# Patient Record
Sex: FEMALE | Race: WHITE
[De-identification: names, ages, dates, MRNs, and addresses within clinical notes are randomized per-mention and may not be internally consistent; named-entity substitution may affect disease eponyms.]

---

## 2020-02-11 ENCOUNTER — HOSPITAL ENCOUNTER (INPATIENT)
Dept: HOSPITAL 53 - M ED | Age: 50
LOS: 3 days | Discharge: HOME | DRG: 720 | End: 2020-02-14
Attending: INTERNAL MEDICINE | Admitting: INTERNAL MEDICINE
Payer: COMMERCIAL

## 2020-02-11 VITALS — WEIGHT: 201.72 LBS | HEIGHT: 62 IN | BODY MASS INDEX: 37.12 KG/M2

## 2020-02-11 DIAGNOSIS — N17.9: ICD-10-CM

## 2020-02-11 DIAGNOSIS — Z87.891: ICD-10-CM

## 2020-02-11 DIAGNOSIS — A41.9: Primary | ICD-10-CM

## 2020-02-11 DIAGNOSIS — Z79.899: ICD-10-CM

## 2020-02-11 DIAGNOSIS — N73.0: ICD-10-CM

## 2020-02-11 DIAGNOSIS — F41.9: ICD-10-CM

## 2020-02-11 LAB
ALBUMIN SERPL BCG-MCNC: 2.6 GM/DL (ref 3.2–5.2)
ALT SERPL W P-5'-P-CCNC: 63 U/L (ref 12–78)
BASOPHILS # BLD AUTO: 0.1 10^3/UL (ref 0–0.2)
BASOPHILS NFR BLD AUTO: 0.3 % (ref 0–1)
BILIRUB CONJ SERPL-MCNC: 0.4 MG/DL (ref 0–0.2)
BILIRUB SERPL-MCNC: 0.8 MG/DL (ref 0.2–1)
BUN SERPL-MCNC: 9 MG/DL (ref 7–18)
CALCIUM SERPL-MCNC: 8.7 MG/DL (ref 8.5–10.1)
CHLAMYDIA DNA AMPLIFICATION: NEGATIVE
CHLORIDE SERPL-SCNC: 101 MEQ/L (ref 98–107)
CO2 SERPL-SCNC: 29 MEQ/L (ref 21–32)
CREAT SERPL-MCNC: 0.75 MG/DL (ref 0.55–1.3)
EOSINOPHIL # BLD AUTO: 0 10^3/UL (ref 0–0.5)
EOSINOPHIL NFR BLD AUTO: 0.1 % (ref 0–3)
GFR SERPL CREATININE-BSD FRML MDRD: > 60 ML/MIN/{1.73_M2} (ref 58–?)
GLUCOSE SERPL-MCNC: 131 MG/DL (ref 70–100)
HCT VFR BLD AUTO: 35.2 % (ref 36–47)
HGB BLD-MCNC: 11.1 G/DL (ref 12–15.5)
LIPASE SERPL-CCNC: 58 U/L (ref 73–393)
LYMPHOCYTES # BLD AUTO: 2.2 10^3/UL (ref 1.5–5)
LYMPHOCYTES NFR BLD AUTO: 7.8 % (ref 24–44)
MCH RBC QN AUTO: 32.5 PG (ref 27–33)
MCHC RBC AUTO-ENTMCNC: 31.5 G/DL (ref 32–36.5)
MCV RBC AUTO: 102.9 FL (ref 80–96)
MONOCYTES # BLD AUTO: 1.9 10^3/UL (ref 0–0.8)
MONOCYTES NFR BLD AUTO: 6.6 % (ref 0–5)
N GONORRHOEA RRNA SPEC QL NAA+PROBE: NEGATIVE
NEUTROPHILS # BLD AUTO: 24 10^3/UL (ref 1.5–8.5)
NEUTROPHILS NFR BLD AUTO: 83.1 % (ref 36–66)
PLATELET # BLD AUTO: 447 10^3/UL (ref 150–450)
POTASSIUM SERPL-SCNC: 3.4 MEQ/L (ref 3.5–5.1)
PROT SERPL-MCNC: 6.9 GM/DL (ref 6.4–8.2)
RBC # BLD AUTO: 3.42 10^6/UL (ref 4–5.4)
SODIUM SERPL-SCNC: 139 MEQ/L (ref 136–145)
WBC # BLD AUTO: 28.9 10^3/UL (ref 4–10)

## 2020-02-11 RX ADMIN — SODIUM CHLORIDE SCH MLS/HR: 9 INJECTION, SOLUTION INTRAVENOUS at 19:06

## 2020-02-11 RX ADMIN — SODIUM CHLORIDE SCH MLS/HR: 9 INJECTION, SOLUTION INTRAVENOUS at 22:41

## 2020-02-11 RX ADMIN — SODIUM CHLORIDE SCH MLS/HR: 9 INJECTION, SOLUTION INTRAVENOUS at 21:19

## 2020-02-11 NOTE — REPVR
PROCEDURE INFORMATION: 

Exam: US Pelvis Complete, Transabdominal 

Exam date and time: 2/11/2020 8:31 PM 

Age: 49 years old 

Clinical indication: Abnormal findings; Abnormal imaging test; Additional info: 

Uterine stranding 



TECHNIQUE: 

Imaging protocol: Real-time transabdominal pelvic ultrasound with image 

documentation. Complete exam. 



COMPARISON: 

CT ABD PELVIS W/O CONTRAST 2/11/2020 5:46 PM 



FINDINGS: 

Uterus/cervix: Intrauterine device is in the lower uterine segment. Endometrial 

thickness is normal at 6 mm. The uterus is mildly enlarged measuring 14 x 7 x 6 

cm. No discrete measurable uterine or endometrial mass is seen. 

Right adnexa: Right ovary is enlarged measuring 8.2 x 6.2 x 6.3 cm. Multiple 

complex cystic areas are present within the right ovary measuring up to 5.5 cm. 

Normal with Doppler blood flow in the right ovary. No hydrosalpinx. 

Left adnexa: Left ovary is enlarged measuring 6.9 x 6.7 x 6.3 cm. Normal blood 

flow in the left ovary. Multiple complex cystic areas are present in the left 

ovary, similar to the right side. No hydrosalpinx. 

Free fluid: None. 

Bladder: Normal. 



IMPRESSION: 

1. Heterogeneous enlarged uterus. Ovaries also appear large and heterogeneous, 

but the ovaries are not separable from the adjacent uterus. Findings may be due 

complex cysts in the ovaries. However findings may be secondary to a 

bicarbonate uterus with multiple fibroids. Pelvic MRI may be beneficial to 

better delineate the anatomy. 

2. IUD lies in the lower uterine segment. 

3. No evidence of torsion. 



Electronically signed by: David Martinez On 02/11/2020  21:44:20 PM

## 2020-02-12 VITALS — DIASTOLIC BLOOD PRESSURE: 99 MMHG | SYSTOLIC BLOOD PRESSURE: 154 MMHG

## 2020-02-12 VITALS — DIASTOLIC BLOOD PRESSURE: 78 MMHG | SYSTOLIC BLOOD PRESSURE: 140 MMHG

## 2020-02-12 VITALS — DIASTOLIC BLOOD PRESSURE: 78 MMHG | SYSTOLIC BLOOD PRESSURE: 135 MMHG

## 2020-02-12 VITALS — SYSTOLIC BLOOD PRESSURE: 113 MMHG | DIASTOLIC BLOOD PRESSURE: 70 MMHG

## 2020-02-12 VITALS — SYSTOLIC BLOOD PRESSURE: 154 MMHG | DIASTOLIC BLOOD PRESSURE: 99 MMHG

## 2020-02-12 LAB
B-HCG SERPL QL: NEGATIVE
BUN SERPL-MCNC: 13 MG/DL (ref 7–18)
CALCIUM SERPL-MCNC: 8.1 MG/DL (ref 8.5–10.1)
CHLORIDE SERPL-SCNC: 103 MEQ/L (ref 98–107)
CO2 SERPL-SCNC: 28 MEQ/L (ref 21–32)
CREAT SERPL-MCNC: 0.74 MG/DL (ref 0.55–1.3)
GFR SERPL CREATININE-BSD FRML MDRD: > 60 ML/MIN/{1.73_M2} (ref 58–?)
GLUCOSE SERPL-MCNC: 132 MG/DL (ref 70–100)
HCT VFR BLD AUTO: 31.2 % (ref 36–47)
HGB BLD-MCNC: 10.1 G/DL (ref 12–15.5)
MCH RBC QN AUTO: 33.4 PG (ref 27–33)
MCHC RBC AUTO-ENTMCNC: 32.4 G/DL (ref 32–36.5)
MCV RBC AUTO: 103.3 FL (ref 80–96)
PLATELET # BLD AUTO: 418 10^3/UL (ref 150–450)
POTASSIUM SERPL-SCNC: 4.1 MEQ/L (ref 3.5–5.1)
RBC # BLD AUTO: 3.02 10^6/UL (ref 4–5.4)
SODIUM SERPL-SCNC: 138 MEQ/L (ref 136–145)
WBC # BLD AUTO: 24.2 10^3/UL (ref 4–10)

## 2020-02-12 RX ADMIN — DEXTROSE MONOHYDRATE SCH MLS/HR: 5 INJECTION INTRAVENOUS at 21:26

## 2020-02-12 RX ADMIN — METRONIDAZOLE SCH MG: 500 TABLET ORAL at 12:40

## 2020-02-12 RX ADMIN — METRONIDAZOLE SCH MG: 500 TABLET ORAL at 21:26

## 2020-02-12 RX ADMIN — ACETAMINOPHEN PRN MG: 325 TABLET ORAL at 00:16

## 2020-02-12 RX ADMIN — ENOXAPARIN SODIUM SCH MG: 40 INJECTION SUBCUTANEOUS at 09:00

## 2020-02-12 RX ADMIN — DEXTROSE MONOHYDRATE SCH MLS/HR: 5 INJECTION INTRAVENOUS at 18:28

## 2020-02-12 RX ADMIN — ACETAMINOPHEN PRN MG: 325 TABLET ORAL at 14:30

## 2020-02-12 RX ADMIN — SODIUM CHLORIDE SCH MLS/HR: 9 INJECTION, SOLUTION INTRAVENOUS at 13:12

## 2020-02-12 RX ADMIN — ACETAMINOPHEN PRN MG: 325 TABLET ORAL at 05:48

## 2020-02-12 RX ADMIN — DEXTROSE MONOHYDRATE SCH MLS/HR: 5 INJECTION INTRAVENOUS at 05:48

## 2020-02-12 RX ADMIN — DEXTROSE MONOHYDRATE SCH MLS/HR: 5 INJECTION INTRAVENOUS at 09:25

## 2020-02-12 NOTE — IPNPDOC
Text Note


Date of Service


The patient was seen on 2/12/20.





NOTE


Subjective: Patient is a 49-year-old female presented to the hospital with se

maura lower abdominal pain. Patient states that she went into a hot tub had not 

been treated with any chemicals for some time a few weeks ago. She states that 

she also started feeling feverish and took her temperature and had a fever. She 

went to the urgent care who initially diagnosed her with a urinary tract 

infection and gave her cephalexin for antibiotics. The urine culture came back 

negative so they called her and told her she does not need to continue with 

antibiotics. She also took Pyridium which did not help with her pain. She still 

states that she has lower abdominal pain at this time. She states that she feels

constipated when she gets her menstrual cycle as she does not want to push too 

hard to have a bowel movement as she is afraid that she will bleed. Patient says

since being in the hospital, she does not feel feverish however, her pain is 

still mildly uncontrolled. Patient is very anxious because one of the imaging 

studies that was performed so there may be a possible malignancy however, the 

ultrasound did not make mention of this and that report and this was performed a

fter the CT scan. Patient is very concerned that she may have a possible 

malignancy. Patient states that she is feeling somewhat better with IV fluid 

hydration.





Review of systems


General: Patient denies fevers


HEENT: Patient denies headaches


Cardiovascular: Patient denies chest pain


Respiratory: Patient denies shortness of breath, cough


GI: Patient endorses lower abdominal pain.


: Patient endorses increased frequency of urination


Neurological: Patient denies numbness or tingling in extremities


Extremities: Patient denies swelling or pain in extremities





Objective: 


Vitals: (see below) 


General: An oriented female patient who was walking gingerly out of the bathroom

back to her bed when I walked into the room. Patient did not appear to be in any

acute distress.


HEENT: Normocephalic, atraumatic, moist mucous membranes.


Cardiac: Regular rate and rhythm, no murmurs, normal S1, normal S2


Pulm: Clear to auscultation bilaterally. No wheezes, rhonchi, rales


Abd: Mild tenderness to palpation of the lower abdominal quadrants and 

suprapubic area.


Ext: No edema bilateral lower extremities


Labs (see below) 


Images: A CT of the abdomen and pelvis without contrast performed on 02/11/2020 

was reported to show uterus is enlarged and heterogeneous with periuterine fat 

stranding with multiple prominent lymph nodes in the periaortic region with 

bilateral hydronephrosis and hydroureter, right greater than left. Findings may 

represent uterine infection or malignancy or both. Intrauterine device lies 

abnormally in the lower uterine segment, increasing the possibility of 

fertility. Gynecology consult is recommended with further evaluation of the 

uterus with ultrasound. Hepatomegaly with hepatic steatosis.


An transabdominal ultrasound of the pelvis performed on 02/11/2020 showed 

intrauterine device in the lower uterine segment, endometrial thickness is 

normal at 6 mm. The uterus is mildly enlarged measuring 14 x 7 x 6 cm. No 

discrete measurable uterine or endometrial mass is seen. Right adnexa shows a 

right ovary that is enlarged measuring 8.2 x 6.2 x 6.3 cm. Multiple complex 

cystic areas are present in the right ovary measuring up to 5.5 cm. Normal with 

Doppler blood flow in the right ovary. No hydrosalpinx. Left adnexa shows left 

ovary that is enlarged measuring 6.9 x 6.7 x 6.3 cm. Normal blood flow in the 

left ovary. Multiple complex cystic areas are present in the left ovary, similar

to the right side. No hydrosalpinx. No free fluid in the bladder appears normal.





Assessment: Patient is a 49-year-old female who presented to the hospital with 

fevers and lower abdominal pain which is thought to be secondary to pelvic 

inflammatory disease.





Plan


1. Sepsis secondary to uterine pathology, pelvic inflammatory disease. Patient 

elevated white count, fevers, and tachycardia upon admission. Patient has been 

given cefotetan 2 g IV every 12 hours and doxycycline 100 mg by mouth every 12 

hours. Dr. Murillo from OB/GYN has seen the patient and agrees with this 

management. He also agrees that this presents pelvic inflammatory disease. 

Patient will also have tumor markers drawn to help rule out malignancy. We will 

continue to monitor the patient. Patient urine also shows that she may have a 

urinary tract infection which is also being covered with antibiotics. We will 

also await urinary culture results. Patient is currently receiving acetaminophen

for pain control as well as Toradol. We will continue monitor the renal 

function.


2. Anxiety. Patient has been started on hydroxyzine 25 mg every 6 hours.





DVT prophy: Lovenox 40 mg daily.





Dispo: Pending clinical improvement





VS,Alvarez, I+O


VS, Fishbone, I+O


Laboratory Tests


2/11/20 17:13








2/12/20 06:20











Vital Signs








  Date Time  Temp Pulse Resp B/P (MAP) Pulse Ox O2 Delivery O2 Flow Rate FiO2


 


2/12/20 06:00 99.5 106 18 113/70 (84) 96 Room Air  














I&O- Last 24 Hours up to 6 AM 


 


 2/12/20





 06:00


 


Intake Total 2790 ml


 


Output Total 0 ml


 


Balance 2790 ml











GME ATTESTATION


GME ATTESTATION


My faculty preceptor for this patient encounter was physically present during 

the encounter and was fully available. All aspects of the patient interview, 

examination, medical decision making process, and medical care plan development 

were reviewed and approved by the faculty preceptor. The faculty preceptor is 

aware and concurs with the plan as stated in the body of this note and will 

attest to such by his/her cosignature.





ATTENDING NOTE


I, Tejal Vázquez, have independently examined this patient and performed my own 

physical exam, as well as reviewed the documentation and edited where necessary.

I have discussed in detail with the resident / student the findings and plan of 

treatment as documented by the resident / student and edited their note. I agree

with their findings and treatment plan and have edited their documentation. I 

will continue to follow the patient during this hospital stay.











BRENDA FREIRE DO               Feb 12, 2020 10:37


TEJAL VÁZQUEZ MD                Feb 12, 2020 12:34

## 2020-02-12 NOTE — CR
DATE OF CONSULTATION:  2020

 

CONSULTING SERVICE:  OB-GYN.

 

HISTORY:

49-year-old female with abdominal pain, constipation and fevers starting

approximately 7-10 days prior to admission.  The night before, she started having

problems.  She spent the evening with friends and was relaxing in a hot tub.  She

woke up on 2020 with fevers and chills.  The fever was as high as 103

Fahrenheit.  She waited a week to see if it improved, but continued to have

fevers on and off.  Her abdominal pain which was sharp and intermittent gradually

improved.  She presented to urgent care and was started on Keflex for a possible

urinary tract infection (UTI).  She subsequently presented to the emergency

department with persistent fevers.  She has had an intrauterine device in place

for 7 years.  She has had no sexual activity in the last month.  She has one

partner for many years.

 

MEDICAL HISTORY:

1.  Bronchitis.

2.  History of tobacco use.  She quit cigarettes in 2019.

 

SURGICAL HISTORY:

 section times two.

 

ALLERGIES:

None.

 

FAMILY HISTORY:

No family history of gynecologic malignancy.  Her brother has been diagnosed with

amyotrophic lateral sclerosis (ALS).

 

SOCIAL HISTORY:

The patient lives in [place].  She quit cigarettes.  She denies drug use.

 

 

MEDICATIONS:

-  Tylenol

-  albuterol

-  naproxen

 

PHYSICAL EXAMINATION:

Temperature 97.8, pulse 102, respiratory rate 16, blood pressure 173/92, oxygen

saturation 95% on room air.

She is in no apparent distress.

Head and Neck Exam:  Normal.

Lungs:  Clear.

Heart:  Regular rate and rhythm.

Abdomen:  Nontender to mild palpation.  Soft.  No masses palpable.

Extremities:  Nontender.

Neurologically intact cranial nerves.

Extremities:  Nontender.

 

LABS:

White blood count 28.9, hemoglobin 11.1 grams per deciliter.

 

Ultrasound shows enlarged uterus 14 x 7 x 6 cm, possibly enlarged right and left

ovaries with complex cystic areas.  CT scan confirms presence of pelvic masses

and possible mildly enlarged periaortic lymph nodes.  Mild right hydronephrosis.

Mild hepatomegaly.

 

ASSESSMENT:

49-year-old,  (G) 3, para (P) 2-0-1-2 female with abdominal pain and

fevers consistent with probable pelvic inflammatory disease.  Treat with

appropriate antibiotics for pelvic inflammatory disease (PID).  Will look for

clinical improvement over 48 hours prior to discharge.  In the meantime, will

draw labs for tumor markers to assess for possible malignancy.  Will continue to

follow.  Appreciate this consult.

## 2020-02-12 NOTE — REP
Clinical:  Pelvic inflammatory disease .

 

Comparison: None .

 

Technique:  PA and lateral.

 

Findings:

The mediastinum and cardiac silhouette are normal.  The lung fields are clear and

without acute consolidation, effusion, or pneumothorax.  The skeletal structures

are intact and normal.

 

Impression:

1.   No acute cardiopulmonary process.

 

 

Electronically Signed by

Jaylon Orozco MD 02/12/2020 11:11 A

## 2020-02-13 VITALS — SYSTOLIC BLOOD PRESSURE: 118 MMHG | DIASTOLIC BLOOD PRESSURE: 70 MMHG

## 2020-02-13 VITALS — SYSTOLIC BLOOD PRESSURE: 119 MMHG | DIASTOLIC BLOOD PRESSURE: 80 MMHG

## 2020-02-13 VITALS — SYSTOLIC BLOOD PRESSURE: 116 MMHG | DIASTOLIC BLOOD PRESSURE: 71 MMHG

## 2020-02-13 VITALS — SYSTOLIC BLOOD PRESSURE: 93 MMHG | DIASTOLIC BLOOD PRESSURE: 56 MMHG

## 2020-02-13 VITALS — DIASTOLIC BLOOD PRESSURE: 67 MMHG | SYSTOLIC BLOOD PRESSURE: 107 MMHG

## 2020-02-13 LAB
BASOPHILS # BLD AUTO: 0.1 10^3/UL (ref 0–0.2)
BASOPHILS NFR BLD AUTO: 0.3 % (ref 0–1)
BUN SERPL-MCNC: 12 MG/DL (ref 7–18)
CALCIUM SERPL-MCNC: 7.4 MG/DL (ref 8.5–10.1)
CHLORIDE SERPL-SCNC: 105 MEQ/L (ref 98–107)
CO2 SERPL-SCNC: 26 MEQ/L (ref 21–32)
CREAT SERPL-MCNC: 1.13 MG/DL (ref 0.55–1.3)
EOSINOPHIL # BLD AUTO: 0.2 10^3/UL (ref 0–0.5)
EOSINOPHIL NFR BLD AUTO: 1.1 % (ref 0–3)
EST. AVERAGE GLUCOSE BLD GHB EST-MCNC: 128 MG/DL (ref 60–110)
GFR SERPL CREATININE-BSD FRML MDRD: 54.9 ML/MIN/{1.73_M2} (ref 58–?)
GLUCOSE SERPL-MCNC: 199 MG/DL (ref 70–100)
HCT VFR BLD AUTO: 28.9 % (ref 36–47)
HGB BLD-MCNC: 9.5 G/DL (ref 12–15.5)
LYMPHOCYTES # BLD AUTO: 1.7 10^3/UL (ref 1.5–5)
LYMPHOCYTES NFR BLD AUTO: 9.7 % (ref 24–44)
MAGNESIUM SERPL-MCNC: 2.2 MG/DL (ref 1.8–2.4)
MCH RBC QN AUTO: 33.8 PG (ref 27–33)
MCHC RBC AUTO-ENTMCNC: 32.9 G/DL (ref 32–36.5)
MCV RBC AUTO: 102.8 FL (ref 80–96)
MONOCYTES # BLD AUTO: 1.2 10^3/UL (ref 0–0.8)
MONOCYTES NFR BLD AUTO: 6.4 % (ref 0–5)
NEUTROPHILS # BLD AUTO: 14.3 10^3/UL (ref 1.5–8.5)
NEUTROPHILS NFR BLD AUTO: 80.4 % (ref 36–66)
PLATELET # BLD AUTO: 414 10^3/UL (ref 150–450)
POTASSIUM SERPL-SCNC: 3.8 MEQ/L (ref 3.5–5.1)
RBC # BLD AUTO: 2.81 10^6/UL (ref 4–5.4)
SODIUM SERPL-SCNC: 139 MEQ/L (ref 136–145)
WBC # BLD AUTO: 17.9 10^3/UL (ref 4–10)

## 2020-02-13 RX ADMIN — METRONIDAZOLE SCH MG: 500 TABLET ORAL at 19:59

## 2020-02-13 RX ADMIN — DOCUSATE SODIUM,SENNOSIDES SCH TAB: 50; 8.6 TABLET, FILM COATED ORAL at 20:00

## 2020-02-13 RX ADMIN — ACETAMINOPHEN PRN MG: 325 TABLET ORAL at 01:23

## 2020-02-13 RX ADMIN — DOXYCYCLINE HYCLATE SCH MG: 100 TABLET, COATED ORAL at 09:03

## 2020-02-13 RX ADMIN — SODIUM CHLORIDE SCH MLS/HR: 9 INJECTION, SOLUTION INTRAVENOUS at 11:12

## 2020-02-13 RX ADMIN — KETOROLAC TROMETHAMINE PRN MG: 30 INJECTION, SOLUTION INTRAMUSCULAR at 01:35

## 2020-02-13 RX ADMIN — METRONIDAZOLE SCH MG: 500 TABLET ORAL at 09:03

## 2020-02-13 RX ADMIN — DOCUSATE SODIUM,SENNOSIDES SCH TAB: 50; 8.6 TABLET, FILM COATED ORAL at 09:03

## 2020-02-13 RX ADMIN — DEXTROSE MONOHYDRATE SCH MLS/HR: 5 INJECTION INTRAVENOUS at 06:02

## 2020-02-13 RX ADMIN — ACETAMINOPHEN PRN MG: 325 TABLET ORAL at 12:00

## 2020-02-13 RX ADMIN — DOXYCYCLINE HYCLATE SCH MG: 100 TABLET, COATED ORAL at 19:59

## 2020-02-13 RX ADMIN — SODIUM CHLORIDE SCH MLS/HR: 9 INJECTION, SOLUTION INTRAVENOUS at 01:23

## 2020-02-13 RX ADMIN — ENOXAPARIN SODIUM SCH MG: 40 INJECTION SUBCUTANEOUS at 09:00

## 2020-02-13 RX ADMIN — KETOROLAC TROMETHAMINE PRN MG: 30 INJECTION, SOLUTION INTRAMUSCULAR at 09:11

## 2020-02-13 NOTE — IPNPDOC
Text Note


Date of Service


The patient was seen on 2/13/20.





NOTE


Subjective: Patient is a 49-year-old female who presented to the hospital with 

lower abdominal pain and was subsequently diagnosed with pelvic inflammatory 

disease. Overnight patient did have one fever of 100.5 at 2016. Patient has been

afebrile since. Patient states that her abdominal pain is improving and she is 

hungry and would like to eat something. Patient has been getting Toradol for 

pain which is helped with her pain. Patient says she has not had a bowel 

movement yet and would like something more for constipation. Patient is feeling 

better however, still is experiencing abdominal pain. Patient says that she went

to urinate and had some purulent material in the toilet after urination. Patient

does not have any dysuria or increased frequency.





Review of systems


General: Patient denies fevers


HEENT: Patient denies headaches


Cardiovascular: Patient denies chest pain


Respiratory: Patient denies shortness of breath, cough


GI: Patient endorses lower abdominal pain and constipation.


: Patient endorses some purulent drainage while urinating however, she denies 

any increased frequency or dysuria.


Extremities: Patient denies swelling or pain in extremities





Objective: 


Vitals: (see below) 


General: Alert and oriented female patient who was sitting in the bed when I 

walked in the room. Patient was in no acute distress.


HEENT: Normocephalic, atraumatic, moist mucous membranes.


Neck: No lymphadenopathy or thyromegaly


Cardiac: Regular rate and rhythm, no murmurs, normal S1, normal S2


Pulm: Clear to auscultation bilaterally. No wheezes, rhonchi, rales


Abd: Mild to moderate abdominal tenderness in the lower quadrants and suprapubic

area. No rebound tenderness. Normoactive bowel sounds


Ext: No edema bilateral lower extremities


Labs (see below) 


Images: No imaging has been performed





Assessment: Patient is a 49-year-old female who presents with lower abdominal 

pain after going into an untreated hot tub who was diagnosed with pelvic 

inflammatory disease.





Plan


1. Pelvic inflammatory disease. Patient is clinically improving however, she 

still had a fever at 20:16 last evening of 100.5. Patient had been getting 

Toradol 30 mg IV for pain control and fever control. Patient was on cefotetan 

and IV doxycycline. Patient was given 1 dose of IM ceftriaxone 250 mg. Patient 

has been transitioned to doxycycline oral 100 mg twice a day and metronidazole 

500 mg twice a day. These will both be for 14 day courses. Doxycycline and date 

will be 02/25/2020 and metronidazole and they will be 02/26/2020.


2. Acute kidney injury patient's creatinine went from 0.74-1.13 with a worsening

of her GFR from greater than 60-54.9. This is most likely secondary to Toradol 

being given. Toradol has been stopped and IV fluids are going to continue at 70 

mL per hour. We will continue to monitor the patient's renal function tomorrow.


3. Anxiety. Patient was started on hydroxyzine 25 mg every 6 hours when 

necessary she does receive 1 dose of this. Patient also has Xanax 0.5 mg twice a

day when necessary.





DVT prophy: Lovenox 40 mg





Dispo: Pending improvement of the patient's renal function off Toradol and 

afebrile for greater than 24 hours.





VS,Fishbone, I+O


VS, Fishbone, I+O


Laboratory Tests


2/13/20 07:44











Vital Signs








  Date Time  Temp Pulse Resp B/P (MAP) Pulse Ox O2 Delivery O2 Flow Rate FiO2


 


2/13/20 08:40 98.5 100 18 118/70 (86)    


 


2/13/20 06:02     97 Room Air  














I&O- Last 24 Hours up to 6 AM 


 


 2/13/20





 06:00


 


Intake Total 3960 ml


 


Output Total 1525 ml


 


Balance 2435 ml











GME ATTESTATION


GME ATTESTATION


My faculty preceptor for this patient encounter was physically present during 

the encounter and was fully available. All aspects of the patient interview, 

examination, medical decision making process, and medical care plan development 

were reviewed and approved by the faculty preceptor. The faculty preceptor is 

aware and concurs with the plan as stated in the body of this note and will 

attest to such by his/her cosignature.





ATTENDING NOTE


I, Tejal Vázquez, have independently examined this patient and performed my own 

physical exam, as well as reviewed the documentation and edited where necessary.

I have discussed in detail with the resident / student the findings and plan of 

treatment as documented by the resident / student and edited their note. I agree

with their findings and treatment plan and have edited their documentation. I 

will continue to follow the patient during this hospital stay.











BRENDA FREIRE DO               Feb 13, 2020 10:21


TEJAL VÁZQUEZ MD                Feb 13, 2020 12:09

## 2020-02-14 VITALS — DIASTOLIC BLOOD PRESSURE: 79 MMHG | SYSTOLIC BLOOD PRESSURE: 124 MMHG

## 2020-02-14 LAB
BASOPHILS # BLD AUTO: 0.1 10^3/UL (ref 0–0.2)
BASOPHILS NFR BLD AUTO: 0.6 % (ref 0–1)
BUN SERPL-MCNC: 10 MG/DL (ref 7–18)
CALCIUM SERPL-MCNC: 8.2 MG/DL (ref 8.5–10.1)
CANCER AG125 SERPL-ACNC: 58.2 U/ML (ref ?–30.2)
CANCER AG19-9 SERPL-ACNC: 3.4 U/ML (ref ?–35)
CHLORIDE SERPL-SCNC: 106 MEQ/L (ref 98–107)
CO2 SERPL-SCNC: 27 MEQ/L (ref 21–32)
CREAT SERPL-MCNC: 0.64 MG/DL (ref 0.55–1.3)
EOSINOPHIL # BLD AUTO: 0.3 10^3/UL (ref 0–0.5)
EOSINOPHIL NFR BLD AUTO: 2.5 % (ref 0–3)
GFR SERPL CREATININE-BSD FRML MDRD: > 60 ML/MIN/{1.73_M2} (ref 58–?)
GLUCOSE SERPL-MCNC: 113 MG/DL (ref 70–100)
HCT VFR BLD AUTO: 28.2 % (ref 36–47)
HGB BLD-MCNC: 9.2 G/DL (ref 12–15.5)
LYMPHOCYTES # BLD AUTO: 2.4 10^3/UL (ref 1.5–5)
LYMPHOCYTES NFR BLD AUTO: 19.7 % (ref 24–44)
MAGNESIUM SERPL-MCNC: 2 MG/DL (ref 1.8–2.4)
MCH RBC QN AUTO: 33.5 PG (ref 27–33)
MCHC RBC AUTO-ENTMCNC: 32.6 G/DL (ref 32–36.5)
MCV RBC AUTO: 102.5 FL (ref 80–96)
MONOCYTES # BLD AUTO: 1.3 10^3/UL (ref 0–0.8)
MONOCYTES NFR BLD AUTO: 10.4 % (ref 0–5)
NEUTROPHILS # BLD AUTO: 7.8 10^3/UL (ref 1.5–8.5)
NEUTROPHILS NFR BLD AUTO: 64.8 % (ref 36–66)
PLATELET # BLD AUTO: 443 10^3/UL (ref 150–450)
POTASSIUM SERPL-SCNC: 3.7 MEQ/L (ref 3.5–5.1)
RBC # BLD AUTO: 2.75 10^6/UL (ref 4–5.4)
SODIUM SERPL-SCNC: 140 MEQ/L (ref 136–145)
WBC # BLD AUTO: 12.1 10^3/UL (ref 4–10)

## 2020-02-14 RX ADMIN — SODIUM CHLORIDE SCH MLS/HR: 9 INJECTION, SOLUTION INTRAVENOUS at 00:13

## 2020-02-14 RX ADMIN — METRONIDAZOLE SCH MG: 500 TABLET ORAL at 09:45

## 2020-02-14 RX ADMIN — DOCUSATE SODIUM,SENNOSIDES SCH TAB: 50; 8.6 TABLET, FILM COATED ORAL at 09:45

## 2020-02-14 RX ADMIN — DOXYCYCLINE HYCLATE SCH MG: 100 TABLET, COATED ORAL at 09:45

## 2020-02-14 RX ADMIN — ENOXAPARIN SODIUM SCH MG: 40 INJECTION SUBCUTANEOUS at 09:00

## 2020-02-14 NOTE — DS.PDOC
Discharge Summary


General


Date of Admission


Feb 11, 2020 at 22:35


Date of Discharge


02/14/2020


Attending Physician:  TEJAL VÁZQUEZ MD


Specialist/Consultants Involve:  LEON WELCH MD





Discharge Summary


PROCEDURES PERFORMED DURING STAY: 


None.





ADMITTING/DISCHARGE DIAGNOSES:


1. Pelvic inflammatory disease


2. Mild acute kidney injury


3. Anxiety


4. Prediabetes





COMPLICATIONS/CHIEF COMPLAINT: Lower abdominal pain





HISTORY OF PRESENT ILLNESS/HOSPITAL COURSE: 


Patient is a 49-year-old female who began to have abdominal pain, constipation, 

and fevers approximately a week and half ago on 02/01/2020. She states that the 

night before she had a few drinks with friends and he spent the evening and 

untreated hot tub. Patient says she woke up the next morning with fever and 

chills. She says that she felt like she was constipated as she had not had a 

bowel movement. Patient says that usually on her menstrual cycle, she gets 

constipated. She does not want to try to bear down and have a bowel movement 

because of the cramping associated with her menstrual cycle. 





Patient states that she went to the urgent care on 02/09/2020 where she was 

diagnosed with urinary tract infection. She's been on cephalexin for 2 days and 

then she received a call the urgent care setting her urine culture was negative 

and was told to stop her antibiotics. Patient returned to urgent care on 

02/11/2020 and was sent to the emergency room due to high labs. In the emergency

department, patient had a pelvic exam performed which revealed bright green 

vaginal discharge with mild cervical motion tenderness. Sample were taken for 

gonorrhea, chlamydia, and Trichomonas which were all negative. A CT of the 

abdomen revealed a heterogeneous enlarged uterus with uterine fat stranding and 

periaortic lymphadenopathy as well as right hydronephrosis and hydroureter. 

Urinalysis showed urine white blood cells too numerous to count and 3+ leukocyte

esterase. The hospitalist service was contacted for admission. 





Throughout the patient's admission to the hospital, patient was receiving antibi

otics. Patient started with IV cefotetan and IV doxycycline for 2 days. Patient 

was then transitioned to oral doxycycline and 1 IM dose of ceftriaxone. 

Patient's wet prep also showed clue cells the patient was started on 

metronidazole on the second day of her hospitalization. 





Patient was given Toradol for pain control however, on this patient's second day

of hospitalization, her creatinine became elevated to 1.13 from 0.74. Toradol 

was stopped and IV fluid hydration was continued. 





OB/GYN was consulted and Dr. Welch saw the patient and agreed with the 

assessment of pelvic inflammatory disease. Dr. Welch will follow-up with the 

patient in his office 2 weeks after discharge. On 02/14/2020, patient's 

creatinine returned to baseline, patient has been afebrile for greater than 24 

hours, and patient clinically appears ready for discharge. Patient also had 

tumor markers ordered which are pending at the time of discharge. These should 

be followed up outpatient with OB/GYN.





Patient was discharged home with doxycycline and metronidazole to complete a 

two-week course of antibiotic therapy. 





Patient's blood sugar had been elevated throughout the hospitalization and an 

A1c was ordered which was 6.1. I did have a conversation with the patient about 

what this means and that she follow-up with a primary care provider for further 

discussion and treatment of prediabetes. I did defer dietary counseling about 

avoiding carbohydrates and trying to eat a healthier diet with more vegetables 

and lean protein. 





DISCHARGE MEDICATIONS: 


Please see below.





ALLERGIES: 


Please see below.





PHYSICAL EXAMINATION ON DISCHARGE:


Vitals: (see below) 


General: Alert and oriented female patient who was laying in bed when I walked 

in the room. Patient did not appear to be in any acute distress.


HEENT: Moist mucous membranes.


Neck: No JVD or lymphadenopathy


Cardiac: RRR, No murmurs


Pulm: Clear to auscultation b/l. No wheezing, rhonchi


Abd: Minimal tenderness in the lower quadrants.


Ext: No edema or cyanosis


LABORATORY DATA: Please see below.


IMAGING: A CT of the abdomen and pelvis performed without contrast on 02/11/2020

was reported to show uterus is enlarged and heterogeneous with periuterine fat 

stranding with multiple prominent lymph nodes in the periaortic region with 

bilateral hydronephrosis and hydroureter, right greater than left. Findings may 

represent uterine infection or malignancy or both. The uterine device lines 

abnormally in the lower uterine segment, increasing the possibility of 

fertility. GYN consultation is recommended with further evaluation of the uterus

with ultrasound. Hepatomegaly with hepatic steatosis.


A trans-abdominal ultrasound performed on 02/11/2020 was reported to show 

heterogeneous enlarged uterus. Ovaries also appeared large and heterogeneous, 

but the ovaries are not separable from the adjacent uterus. Findings may be due 

to complex cysts in the ovaries. However findings may be secondary to a 

bicornate uterus with multiple fibroids. Pelvic MRI may be beneficial to better 

delineate the anatomy. IUD lives in the lower uterine segment. No evidence of 

torsion.


A chest x-ray performed on 02/12/2020 was reported to show no acute 

cardiopulmonary process





PROGNOSIS: Good





ACTIVITY: As tolerated.





DIET: Pre-diabetic diet consisting of low carbohydrate foods





DISCHARGE PLAN/DISPOSITION: Discharge home





DISCHARGE INSTRUCTIONS:


1. Follow-up with Dr. Welch in 2 weeks.


2. Follow-up on results of tumor markers.


3. Establish and follow up with a primary care provider for hospital follow-up 

and further health maintenance.


4. Continue doxycycline 100 mg by mouth twice a day until 02/25/2020. Continue 

metronidazole 500 mg by mouth twice a day until 02/26/2020.


5. Return to the ER if you experience any problems 





DISCHARGE CONDITION: Stable.





TIME SPENT ON DISCHARGE: Greater than 30 minutes.





Cc Dr. Leon Welch





Vital Signs/I&Os





Vital Signs








  Date Time  Temp Pulse Resp B/P (MAP) Pulse Ox O2 Delivery O2 Flow Rate FiO2


 


2/14/20 09:47   18   Room Air  


 


2/14/20 06:09 96.7 88  124/79 (94) 97   














I&O- Last 24 Hours up to 6 AM 


 


 2/14/20





 06:00


 


Intake Total 4071 ml


 


Output Total 2300 ml


 


Balance 1771 ml











Laboratory Data


Labs 24H


Laboratory Tests 2


2/13/20 11:53: Bedside Glucose (Misc Panel) 81


2/13/20 18:05: Bedside Glucose (Misc Panel) 94


2/13/20 22:21: Bedside Glucose (Misc Panel) 146H


2/14/20 07:18: 


Immature Granulocyte % (Auto) 2.0, Neutrophils (%) (Auto) 64.8, Lymphocytes (%) 

(Auto) 19.7L, Monocytes (%) (Auto) 10.4H, Eosinophils (%) (Auto) 2.5, Basophils 

(%) (Auto) 0.6, Neutrophils # (Auto) 7.8, Lymphocytes # (Auto) 2.4, Monocytes # 

(Auto) 1.3H, Eosinophils # (Auto) 0.3, Basophils # (Auto) 0.1, Nucleated Red 

Blood Cells % (auto) 0.0, Anion Gap 7L, Glomerular Filtration Rate > 60.0, 

Calcium Level 8.2L, Magnesium Level 2.0


CBC/BMP


Laboratory Tests


2/14/20 07:18








FSBS





Laboratory Tests








Test


 2/13/20


11:53 2/13/20


18:05 2/13/20


22:21 Range/Units


 


 


Bedside Glucose (Misc Panel) 81 94 146   MG/DL











Microbiology





Microbiology


2/12/20 Blood Culture - Preliminary, Resulted


          No Growth after 48 hours. All Specime...


2/12/20 Blood Culture - Preliminary, Resulted


          No Growth after 48 hours. All Specime...


2/11/20 Wet Prep - Final, Complete


          


2/11/20 Gram Stain - Final, Complete


          


2/11/20 Genital Culture - Final, Complete


          


2/11/20 Urine Culture - Final, Complete





Discharge Medications


Scheduled


Doxycycline Hyclate (Doxycycline Hyclate) 100 Mg Tablet, 100 MG PO BID


Metronidazole (Flagyl) 500 Mg Tablet, 500 MG PO BID





Scheduled PRN


Acetaminophen (Tylenol Extra Strength) 500 Mg Tablet, 500 MG PO Q6H PRN for 

PAIN, (Reported)


Albuterol Sulfate (Proair Hfa) 8.5 Gm Hfa.aer.ad, 2 PUFF INH QID PRN for 

SHORTNESS OF BREATH, (Reported)


Oxycodone/Acetaminophen (Oxycodone-Acetaminophen 5-325) 1 Each Tablet, 1 TAB PO 

Q8HP PRN for MILD/MODERATE PAIN (PS 1-7)





Allergies


Coded Allergies:  


     No Known Allergies (Unverified , 2/11/20)





GME ATTESTATION


GME ATTESTATION


My faculty preceptor for this patient encounter was physically present during 

the encounter and was fully available. All aspects of the patient interview, 

examination, medical decision making process, and medical care plan development 

were reviewed and approved by the faculty preceptor. The faculty preceptor is 

aware and concurs with the plan as stated in the body of this note and will 

attest to such by his/her cosignature.





ATTENDING NOTE


I, Tejal Vázquez, have independently examined this patient and performed my own 

physical exam, as well as reviewed the documentation and edited where necessary.

I have discussed in detail with the resident / student the findings and plan of 

treatment as documented by the resident / student and edited their note. I agree

with their findings and treatment plan and have edited their documentation. I 

will continue to follow the patient during this hospital stay.





Time spent on discharge


35 minutes











MOUNA,BRENDA DO               Feb 14, 2020 10:02


TEJAL VÁZQUEZ MD                Feb 14, 2020 12:22

## 2020-03-05 ENCOUNTER — HOSPITAL ENCOUNTER (OUTPATIENT)
Dept: HOSPITAL 53 - M SFHCCAPE | Age: 50
End: 2020-03-05
Attending: PHYSICIAN ASSISTANT
Payer: COMMERCIAL

## 2020-03-05 DIAGNOSIS — I10: ICD-10-CM

## 2020-03-05 DIAGNOSIS — N73.9: ICD-10-CM

## 2020-03-05 DIAGNOSIS — D64.9: Primary | ICD-10-CM

## 2020-03-05 LAB
ALBUMIN SERPL BCG-MCNC: 3.8 GM/DL (ref 3.2–5.2)
ALT SERPL W P-5'-P-CCNC: 50 U/L (ref 12–78)
APPEARANCE UR: CLEAR
BACTERIA UR QL AUTO: (no result)
BASOPHILS # BLD AUTO: 0.1 10^3/UL (ref 0–0.2)
BASOPHILS NFR BLD AUTO: 1.6 % (ref 0–1)
BILIRUB SERPL-MCNC: 0.3 MG/DL (ref 0.2–1)
BILIRUB UR QL STRIP.AUTO: NEGATIVE
BUN SERPL-MCNC: 11 MG/DL (ref 7–18)
CALCIUM SERPL-MCNC: 9.4 MG/DL (ref 8.5–10.1)
CHLORIDE SERPL-SCNC: 102 MEQ/L (ref 98–107)
CHOLEST SERPL-MCNC: 221 MG/DL (ref ?–200)
CHOLEST/HDLC SERPL: 4.51 {RATIO} (ref ?–5)
CO2 SERPL-SCNC: 28 MEQ/L (ref 21–32)
CREAT SERPL-MCNC: 0.78 MG/DL (ref 0.55–1.3)
EOSINOPHIL # BLD AUTO: 0.4 10^3/UL (ref 0–0.5)
EOSINOPHIL NFR BLD AUTO: 5 % (ref 0–3)
FERRITIN SERPL-MCNC: 166 NG/ML (ref 8–252)
FOLATE SERPL-MCNC: 13.1 NG/ML
GFR SERPL CREATININE-BSD FRML MDRD: > 60 ML/MIN/{1.73_M2} (ref 58–?)
GLUCOSE SERPL-MCNC: 103 MG/DL (ref 70–100)
GLUCOSE UR QL STRIP.AUTO: NEGATIVE MG/DL
HCT VFR BLD AUTO: 39.6 % (ref 36–47)
HDLC SERPL-MCNC: 49 MG/DL (ref 40–?)
HGB BLD-MCNC: 12.9 G/DL (ref 12–15.5)
HGB UR QL STRIP.AUTO: NEGATIVE
IRON SERPL-MCNC: 64 UG/DL (ref 50–170)
KETONES UR QL STRIP.AUTO: NEGATIVE MG/DL
LDLC SERPL CALC-MCNC: 138 MG/DL (ref ?–100)
LEUKOCYTE ESTERASE UR QL STRIP.AUTO: (no result)
LYMPHOCYTES # BLD AUTO: 2.5 10^3/UL (ref 1.5–5)
LYMPHOCYTES NFR BLD AUTO: 36 % (ref 24–44)
MCH RBC QN AUTO: 33.4 PG (ref 27–33)
MCHC RBC AUTO-ENTMCNC: 32.6 G/DL (ref 32–36.5)
MCV RBC AUTO: 102.6 FL (ref 80–96)
MONOCYTES # BLD AUTO: 0.6 10^3/UL (ref 0–0.8)
MONOCYTES NFR BLD AUTO: 8.8 % (ref 0–5)
MUCOUS THREADS URNS QL MICRO: (no result)
NEUTROPHILS # BLD AUTO: 3.4 10^3/UL (ref 1.5–8.5)
NEUTROPHILS NFR BLD AUTO: 48.3 % (ref 36–66)
NITRITE UR QL STRIP.AUTO: NEGATIVE
NONHDLC SERPL-MCNC: 172 MG/DL
PH UR STRIP.AUTO: 6 UNITS (ref 5–9)
PLATELET # BLD AUTO: 236 10^3/UL (ref 150–450)
POTASSIUM SERPL-SCNC: 4.2 MEQ/L (ref 3.5–5.1)
PROT SERPL-MCNC: 7.7 GM/DL (ref 6.4–8.2)
PROT UR QL STRIP.AUTO: NEGATIVE MG/DL
RBC # BLD AUTO: 3.86 10^6/UL (ref 4–5.4)
RBC # UR AUTO: 1 /HPF (ref 0–3)
SODIUM SERPL-SCNC: 138 MEQ/L (ref 136–145)
SP GR UR STRIP.AUTO: 1.01 (ref 1–1.03)
SQUAMOUS #/AREA URNS AUTO: 4 /HPF (ref 0–6)
TRIGL SERPL-MCNC: 170 MG/DL (ref ?–150)
TSH SERPL DL<=0.005 MIU/L-ACNC: 0.98 UIU/ML (ref 0.36–3.74)
UROBILINOGEN UR QL STRIP.AUTO: 0.2 MG/DL (ref 0–2)
VIT B12 SERPL-MCNC: 652 PG/ML
WBC # BLD AUTO: 7.1 10^3/UL (ref 4–10)
WBC #/AREA URNS AUTO: 2 /HPF (ref 0–3)

## 2020-03-11 ENCOUNTER — HOSPITAL ENCOUNTER (OUTPATIENT)
Dept: HOSPITAL 53 - M WHC | Age: 50
End: 2020-03-11
Attending: OBSTETRICS & GYNECOLOGY
Payer: SELF-PAY

## 2020-03-11 DIAGNOSIS — N83.209: Primary | ICD-10-CM

## 2020-03-12 NOTE — REP
Clinical:  Follow up ovarian cyst.

 

Comparison:  02/11/2020.

 

Technique:  Transabdominal pelvic ultrasound followed by transvaginal examination

for better evaluation of the endometrium and adnexa.

 

Findings:

Bladder is partially distended and poorly evaluated.

 

Anteverted uterus measures 7.9 x 4.5 x 5.7 cm.  Endometrial complex measures 5.2

mm thickness.  Bilateral ovaries are normal in vascularity without torsion.

Right ovary measures 5.2 x 4.5 x 3.8 cm and includes multiple simple and complex

cysts - the largest measuring 3.3 x 2.6 x 2.1 cm and similar to prior

examination.  Left ovary measures 3.4 x 1.6 x 3.4 cm.

 

No pelvic fluid or adnexal mass lesion.

 

Impression:

1.  Relatively normal appearance to the uterus.

2.  Cystic changes to the bilateral ovaries on prior examination appear to be

decreased/improving with only a few residual simple and complex cysts noted in

the right ovary measuring up to 3.3 cm.

## 2022-10-05 NOTE — REPVR
PROCEDURE INFORMATION: 

Exam: CT Abdomen And Pelvis Without Contrast 

Exam date and time: 2/11/2020 5:48 PM 

Age: 49 years old 

Clinical indication: Abdominal pain; Additional info: Abd pain 



TECHNIQUE: 

Imaging protocol: Computed tomography of the abdomen and pelvis without 

contrast. 

Radiation optimization: All CT scans at this facility use at least one of these 

dose optimization techniques: automated exposure control; mA and/or kV 

adjustment per patient size (includes targeted exams where dose is matched to 

clinical indication); or iterative reconstruction. 



COMPARISON: 

No relevant prior studies available. 



FINDINGS: 



Liver: Fatty infiltration of the liver. Liver is enlarged and measures 21 cm. 

Gallbladder and bile ducts: Normal. No calcified stones. No ductal dilation. 

Pancreas: Normal. No ductal dilation. 

Spleen: Normal. No splenomegaly. 

Adrenals: Normal. No mass. 

Kidneys and ureters: There is right hydronephrosis and hydroureter. Mild 

caliceal prominence and ureteral prominence on the left. 

Stomach and bowel: Scattered diverticulosis of the sigmoid colon. 

Appendix: No evidence of appendicitis. 

Intraperitoneal space: Unremarkable. No free air. No significant fluid 

collection. 

Vasculature: Unremarkable. No abdominal aortic aneurysm. 

Lymph nodes: There are multiple para-aortic lymph nodes with the largest 

measuring 1.2 cm in the left para-aortic region. 



Bladder: Unremarkable as visualized. 

Reproductive: The intrauterine device is seen. Bicornuate enlarged uterus. The 

intrauterine device lies in the lower uterine segment. The uterus is 

heterogenous and measures 7.6 x 11.4 x 11.4 cm. There is periuterine fat 

stranding. 

Bones/joints: Unremarkable. No acute fracture. 

Soft tissues: Unremarkable. 



IMPRESSION: 

Uterus is enlarged and heterogeneous with periuterine fat stranding with 

multiple prominent lymph nodes in the para-aortic region with bilateral 

hydronephrosis and hydroureter, right greater than left. Finding may represent 

uterine infection or malignancy or both. The intrauterine device lies 

abnormally in the lower uterine segment, increasing the possibility of 

fertility. GYN consult is recommended with further evaluation of the uterus 

with ultrasound.

Hepatomegaly with hepatic steatosis.



Electronically signed by: Wesley Leyva On 02/11/2020  19:16:21 PM no